# Patient Record
Sex: FEMALE | Race: WHITE
[De-identification: names, ages, dates, MRNs, and addresses within clinical notes are randomized per-mention and may not be internally consistent; named-entity substitution may affect disease eponyms.]

---

## 2019-01-17 ENCOUNTER — HOSPITAL ENCOUNTER (OUTPATIENT)
Dept: HOSPITAL 80 - FIMAGING | Age: 24
End: 2019-01-17
Attending: PHYSICIAN ASSISTANT
Payer: MEDICAID

## 2019-01-17 DIAGNOSIS — R10.13: Primary | ICD-10-CM

## 2019-02-05 ENCOUNTER — HOSPITAL ENCOUNTER (EMERGENCY)
Dept: HOSPITAL 80 - FED | Age: 24
Discharge: HOME | End: 2019-02-05
Payer: MEDICAID

## 2019-02-05 VITALS — SYSTOLIC BLOOD PRESSURE: 107 MMHG | DIASTOLIC BLOOD PRESSURE: 70 MMHG

## 2019-02-05 DIAGNOSIS — R11.2: Primary | ICD-10-CM

## 2019-02-05 DIAGNOSIS — R10.10: ICD-10-CM

## 2019-02-05 LAB — PLATELET # BLD: 342 10^3/UL (ref 150–400)
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
